# Patient Record
Sex: MALE | Race: ASIAN | NOT HISPANIC OR LATINO | URBAN - METROPOLITAN AREA
[De-identification: names, ages, dates, MRNs, and addresses within clinical notes are randomized per-mention and may not be internally consistent; named-entity substitution may affect disease eponyms.]

---

## 2019-08-06 PROBLEM — 88111009 CHANGE IN BOWEL HABIT: Status: ACTIVE | Noted: 2017-01-25

## 2022-01-04 ENCOUNTER — OFFICE VISIT (OUTPATIENT)
Dept: URBAN - METROPOLITAN AREA CLINIC 35 | Facility: CLINIC | Age: 57
End: 2022-01-04
Payer: COMMERCIAL

## 2022-01-04 VITALS
DIASTOLIC BLOOD PRESSURE: 90 MMHG | SYSTOLIC BLOOD PRESSURE: 162 MMHG | HEIGHT: 69 IN | OXYGEN SATURATION: 96 % | WEIGHT: 225 LBS | HEART RATE: 85 BPM | BODY MASS INDEX: 33.33 KG/M2

## 2022-01-04 DIAGNOSIS — K60.2 ANAL FISSURE: ICD-10-CM

## 2022-01-04 DIAGNOSIS — K62.5 HEMORRHAGE OF ANUS AND RECTUM: ICD-10-CM

## 2022-01-04 DIAGNOSIS — D50.9 IRON DEFICIENCY ANEMIA, UNSPECIFIED: ICD-10-CM

## 2022-01-04 DIAGNOSIS — K21.9 GASTRO-ESOPHAGEAL REFLUX DISEASE WITHOUT ESOPHAGITIS: ICD-10-CM

## 2022-01-04 PROBLEM — 30037006: Status: ACTIVE | Noted: 2019-08-06

## 2022-01-04 PROCEDURE — 99213 OFFICE O/P EST LOW 20 MIN: CPT | Performed by: INTERNAL MEDICINE

## 2022-01-04 RX ORDER — VITAMIN K2 90 MCG
1 CAPSULE CAPSULE ORAL ONCE A DAY
Status: ON HOLD | COMMUNITY

## 2022-01-04 RX ORDER — OMEPRAZOLE 40 MG/1
CAPSULE, DELAYED RELEASE PELLETS ORAL
OUTPATIENT

## 2022-01-04 RX ORDER — LOSARTAN POTASSIUM 100 MG/1
1 TABLET TABLET ORAL ONCE A DAY
Status: ACTIVE | COMMUNITY

## 2022-01-04 RX ORDER — SODIUM, POTASSIUM,MAG SULFATES 17.5-3.13G
17.5 GM/177 ML SOLUTION, RECONSTITUTED, ORAL ORAL
Qty: 1 KIT | Refills: 0 | OUTPATIENT
Start: 2022-01-04 | End: 2022-01-06

## 2022-01-04 RX ORDER — OMEPRAZOLE 40 MG/1
CAPSULE, DELAYED RELEASE PELLETS ORAL
Status: ACTIVE | COMMUNITY

## 2022-01-04 RX ORDER — RANITIDINE HYDROCHLORIDE 150 MG/1
1 CAPSULE CAPSULE ORAL TWICE A DAY
Status: ON HOLD | COMMUNITY

## 2022-01-04 RX ORDER — LEVOTHYROXINE SODIUM 150 UG/1
1 TABLET ON AN EMPTY STOMACH IN THE MORNING TABLET ORAL ONCE A DAY
Status: ACTIVE | COMMUNITY

## 2022-01-04 RX ORDER — OCTISALATE, AVOBENZONE, HOMOSALATE, AND OCTOCRYLENE 29.4; 29.4; 49; 25.48 MG/ML; MG/ML; MG/ML; MG/ML
AS DIRECTED LOTION TOPICAL
Status: ON HOLD | COMMUNITY
Start: 2017-01-25

## 2022-01-04 RX ORDER — PRENATAL 168/IRON/FOLIC/OMEGA3 27-800-235
CAPSULE ORAL
Status: ACTIVE | COMMUNITY

## 2022-01-04 RX ORDER — FLUOXETINE HYDROCHLORIDE 20 MG/1
1 CAPSULE IN THE MORNING CAPSULE ORAL ONCE A DAY
Status: ACTIVE | COMMUNITY

## 2022-01-04 NOTE — PHYSICAL EXAM HENT:
Head,  normocephalic,  atraumatic,  Face,  Face within normal limits,  Ears,  External ears within normal limits,  Nose/Nasopharynx,  External nose  normal appearance,  nares patent,  no nasal discharge,  Mouth and Throat,  Oral cavity appearance normal,  Breath odor normal,  Lips,  Appearance normal. MC- II

## 2022-01-04 NOTE — HPI-GERD
Patient admits control of symptoms with the use of Omeprazole 40 mg daily. Last visit (8/6/2019)  Patient presents for follow up of GERD. Patient admits he is currently taking Omeprazole 40mg daily as prescribed without true relief . He admits taking Rx twice a day most day for uncontrolled symptoms. Patient admits intially trying OTC Zantac for relief of breakthrough episodes with no true relief. Patient admits symptoms start to flare aroung 7pm before dinner. Patient admits having associated epigastric discomfort . He denies any nausa/vomiting at this time .

## 2022-01-04 NOTE — HPI-RECTAL BLEEDING
He denies  continued episodes of rectal bleeding since last visit .   Last visit (8/6/2019)  Patient presents today as a consultation for rectal bleeding. Patient states onset was last Wednesday and admits he only had one episode . Patient describes seeing bright red blood upon toilet paper after wiping . Patient admits feeling burning sensation for the remainder of the evening. He admits taking an OTC Ibuprofen to help relieve discomfort. He admits the next day all symptoms subsided . Patient denies straining during bowel movements. He admits having a bowel movement daily with normal formed stools.          Patients last colonoscopy was completed 06/2017 with normal report .  Most recent labs completed with PCP [place] June 2019.

## 2022-01-04 NOTE — HPI-IRON DEFICIENCY
Patient presents today for a consultation of JAZMIN found via labs with PCP due to generalized weakness on (12/8/2021) with Iron TIBC and Ferritin:  Ferritin (Low) 7, Iron [place] (High) 528, % Sat (Low) 12, Iron total  (WNL) 62.    CBC: Hgb (Low) 13.0, Hct (WNL)  40.9, WBC (WNL) 6.3, RBCC (WNL) 5.05, MCH (Low) 25.7, MCHC (Low) 31.8, Platelet (WNL)  313.   He began treatment with 65 mg Vitron C iron supplement 1 QD x 7 days, then d/c'd on (1/2/2022) in case he was to schedule an endoscopy.  Repeat labs with Dr. Ron Tom on (12/28/2021)  with Iron TIBC and Ferritin:  Ferritin (Low) 7, Iron [place] Cap (High) 482, % Sat (Low) 12, Iron total  (WNL) 60.    CBC: Hgb (Low) 11.6, Hct (WNL)  38.8, WBC (WNL) 6.3, RBCC (WNL) 4.55, MCH (Low) 25.5, MCHC (Low) 29.9, RDW (High) 15.4, Platelet (WNL) 255. Vit B 12 (WNL) 515  He denies a prior history of JAZMIN.  He denies having a transfusion or iron infusion.  Patient denies NSAID use, a history of GI bleeding, any blood or melena in stools, epigastric/abdominal pains, cold extremities.  He admits experiencing weakness, light headedness, and dizziness.    Patient had an EGD in (2011) and Colonoscopy in (6/ 2017)

## 2022-01-07 ENCOUNTER — TELEPHONE ENCOUNTER (OUTPATIENT)
Dept: URBAN - METROPOLITAN AREA CLINIC 36 | Facility: CLINIC | Age: 57
End: 2022-01-07

## 2022-01-12 ENCOUNTER — OFFICE VISIT (OUTPATIENT)
Dept: URBAN - METROPOLITAN AREA SURGERY CENTER 8 | Facility: SURGERY CENTER | Age: 57
End: 2022-01-12
Payer: COMMERCIAL

## 2022-01-12 DIAGNOSIS — D50.9 ANEMIA: ICD-10-CM

## 2022-01-12 DIAGNOSIS — K29.60 ADENOPAPILLOMATOSIS GASTRICA: ICD-10-CM

## 2022-01-12 DIAGNOSIS — D12.3 ADENOMA OF TRANSVERSE COLON: ICD-10-CM

## 2022-01-12 DIAGNOSIS — K31.89 ACQUIRED DEFORMITY OF DUODENUM: ICD-10-CM

## 2022-01-12 DIAGNOSIS — D12.2 ADENOMA OF ASCENDING COLON: ICD-10-CM

## 2022-01-12 DIAGNOSIS — K22.89 ESOPHAGEAL BLEEDING: ICD-10-CM

## 2022-01-12 PROCEDURE — G8907 PT DOC NO EVENTS ON DISCHARG: HCPCS | Performed by: INTERNAL MEDICINE

## 2022-01-12 PROCEDURE — 43239 EGD BIOPSY SINGLE/MULTIPLE: CPT | Performed by: INTERNAL MEDICINE

## 2022-01-12 PROCEDURE — 45385 COLONOSCOPY W/LESION REMOVAL: CPT | Performed by: INTERNAL MEDICINE

## 2022-01-12 RX ORDER — RANITIDINE HYDROCHLORIDE 150 MG/1
1 CAPSULE CAPSULE ORAL TWICE A DAY
Status: ON HOLD | COMMUNITY

## 2022-01-12 RX ORDER — LEVOTHYROXINE SODIUM 150 UG/1
1 TABLET ON AN EMPTY STOMACH IN THE MORNING TABLET ORAL ONCE A DAY
Status: ACTIVE | COMMUNITY

## 2022-01-12 RX ORDER — LOSARTAN POTASSIUM 100 MG/1
1 TABLET TABLET ORAL ONCE A DAY
Status: ACTIVE | COMMUNITY

## 2022-01-12 RX ORDER — OCTISALATE, AVOBENZONE, HOMOSALATE, AND OCTOCRYLENE 29.4; 29.4; 49; 25.48 MG/ML; MG/ML; MG/ML; MG/ML
AS DIRECTED LOTION TOPICAL
Status: ON HOLD | COMMUNITY
Start: 2017-01-25

## 2022-01-12 RX ORDER — PRENATAL 168/IRON/FOLIC/OMEGA3 27-800-235
CAPSULE ORAL
Status: ACTIVE | COMMUNITY

## 2022-01-12 RX ORDER — VITAMIN K2 90 MCG
1 CAPSULE CAPSULE ORAL ONCE A DAY
Status: ON HOLD | COMMUNITY

## 2022-01-12 RX ORDER — OMEPRAZOLE 40 MG/1
CAPSULE, DELAYED RELEASE PELLETS ORAL
Status: ACTIVE | COMMUNITY

## 2022-01-12 RX ORDER — FLUOXETINE HYDROCHLORIDE 20 MG/1
1 CAPSULE IN THE MORNING CAPSULE ORAL ONCE A DAY
Status: ACTIVE | COMMUNITY

## 2022-01-13 ENCOUNTER — TELEPHONE ENCOUNTER (OUTPATIENT)
Dept: URBAN - METROPOLITAN AREA CLINIC 36 | Facility: CLINIC | Age: 57
End: 2022-01-13

## 2022-01-13 RX ORDER — FLUOXETINE HYDROCHLORIDE 20 MG/1
1 CAPSULE IN THE MORNING CAPSULE ORAL ONCE A DAY
Status: ACTIVE | COMMUNITY

## 2022-01-13 RX ORDER — RANITIDINE HYDROCHLORIDE 150 MG/1
1 CAPSULE CAPSULE ORAL TWICE A DAY
Status: ON HOLD | COMMUNITY

## 2022-01-13 RX ORDER — OCTISALATE, AVOBENZONE, HOMOSALATE, AND OCTOCRYLENE 29.4; 29.4; 49; 25.48 MG/ML; MG/ML; MG/ML; MG/ML
AS DIRECTED LOTION TOPICAL
Status: ON HOLD | COMMUNITY
Start: 2017-01-25

## 2022-01-13 RX ORDER — LEVOTHYROXINE SODIUM 150 UG/1
1 TABLET ON AN EMPTY STOMACH IN THE MORNING TABLET ORAL ONCE A DAY
Status: ACTIVE | COMMUNITY

## 2022-01-13 RX ORDER — FERROUS FUMARATE AND POLYSACCHRIDE IRON VITAMIN MINERAL COMPLEX SUPPLEMENT 191.2; 135.9; 1; 210; 20; 5; 5; 7; 25; 3 MG/1; MG/1; MG/1; MG/1; MG/1; MG/1; MG/1; MG/1; MG/1; UG/1
1 CAPSULE CAPSULE ORAL
Qty: 180 | Refills: 1 | OUTPATIENT
Start: 2022-01-13

## 2022-01-13 RX ORDER — OMEPRAZOLE 40 MG/1
CAPSULE, DELAYED RELEASE PELLETS ORAL
Status: ACTIVE | COMMUNITY

## 2022-01-13 RX ORDER — LOSARTAN POTASSIUM 100 MG/1
1 TABLET TABLET ORAL ONCE A DAY
Status: ACTIVE | COMMUNITY

## 2022-01-13 RX ORDER — VITAMIN K2 90 MCG
1 CAPSULE CAPSULE ORAL ONCE A DAY
Status: ON HOLD | COMMUNITY

## 2022-01-13 RX ORDER — PRENATAL 168/IRON/FOLIC/OMEGA3 27-800-235
CAPSULE ORAL
Status: ACTIVE | COMMUNITY

## 2022-01-20 ENCOUNTER — TELEPHONE ENCOUNTER (OUTPATIENT)
Dept: URBAN - METROPOLITAN AREA CLINIC 36 | Facility: CLINIC | Age: 57
End: 2022-01-20

## 2022-01-25 ENCOUNTER — TELEPHONE ENCOUNTER (OUTPATIENT)
Dept: URBAN - METROPOLITAN AREA CLINIC 36 | Facility: CLINIC | Age: 57
End: 2022-01-25

## 2022-02-02 ENCOUNTER — TELEPHONE ENCOUNTER (OUTPATIENT)
Dept: URBAN - METROPOLITAN AREA CLINIC 36 | Facility: CLINIC | Age: 57
End: 2022-02-02

## 2022-02-03 ENCOUNTER — OFFICE VISIT (OUTPATIENT)
Dept: URBAN - METROPOLITAN AREA CLINIC 32 | Facility: CLINIC | Age: 57
End: 2022-02-03
Payer: COMMERCIAL

## 2022-02-03 ENCOUNTER — TELEPHONE ENCOUNTER (OUTPATIENT)
Dept: URBAN - METROPOLITAN AREA SURGERY CENTER 8 | Facility: SURGERY CENTER | Age: 57
End: 2022-02-03

## 2022-02-03 DIAGNOSIS — D50.9 ANEMIA: ICD-10-CM

## 2022-02-03 PROCEDURE — 91110 GI TRC IMG INTRAL ESOPH-ILE: CPT | Performed by: INTERNAL MEDICINE

## 2022-02-10 ENCOUNTER — OFFICE VISIT (OUTPATIENT)
Dept: URBAN - METROPOLITAN AREA CLINIC 33 | Facility: CLINIC | Age: 57
End: 2022-02-10

## 2022-02-14 ENCOUNTER — TELEPHONE ENCOUNTER (OUTPATIENT)
Dept: URBAN - METROPOLITAN AREA CLINIC 36 | Facility: CLINIC | Age: 57
End: 2022-02-14

## 2022-02-15 ENCOUNTER — TELEPHONE ENCOUNTER (OUTPATIENT)
Dept: URBAN - METROPOLITAN AREA CLINIC 36 | Facility: CLINIC | Age: 57
End: 2022-02-15

## 2022-02-15 ENCOUNTER — OFFICE VISIT (OUTPATIENT)
Dept: URBAN - METROPOLITAN AREA CLINIC 31 | Facility: CLINIC | Age: 57
End: 2022-02-15
Payer: COMMERCIAL

## 2022-02-15 ENCOUNTER — OFFICE VISIT (OUTPATIENT)
Dept: URBAN - METROPOLITAN AREA CLINIC 35 | Facility: CLINIC | Age: 57
End: 2022-02-15

## 2022-02-15 VITALS — BODY MASS INDEX: 33.33 KG/M2 | HEIGHT: 69 IN | WEIGHT: 225 LBS

## 2022-02-15 DIAGNOSIS — D12.2 BENIGN NEOPLASM OF ASCENDING COLON: ICD-10-CM

## 2022-02-15 DIAGNOSIS — D12.3 BENIGN NEOPLASM OF TRANSVERSE COLON: ICD-10-CM

## 2022-02-15 DIAGNOSIS — K21.9 GASTRO-ESOPHAGEAL REFLUX DISEASE WITHOUT ESOPHAGITIS: ICD-10-CM

## 2022-02-15 DIAGNOSIS — D50.9 IRON DEFICIENCY ANEMIA, UNSPECIFIED: ICD-10-CM

## 2022-02-15 DIAGNOSIS — K64.0 FIRST DEGREE HEMORRHOIDS: ICD-10-CM

## 2022-02-15 DIAGNOSIS — K29.30 CHRONIC SUPERFICIAL GASTRITIS, PRESENCE OF BLEEDING UNSPECIFIED: ICD-10-CM

## 2022-02-15 PROBLEM — 196735001: Status: ACTIVE | Noted: 2022-02-15

## 2022-02-15 PROCEDURE — 99214 OFFICE O/P EST MOD 30 MIN: CPT | Performed by: INTERNAL MEDICINE

## 2022-02-15 RX ORDER — FAMOTIDINE 20 MG/1
1 TABLET PM  ( BEFORE DINNER ) TABLET, FILM COATED ORAL ONCE A DAY
Qty: 90 TABLET | Refills: 1 | OUTPATIENT
Start: 2022-02-15

## 2022-02-15 RX ORDER — OCTISALATE, AVOBENZONE, HOMOSALATE, AND OCTOCRYLENE 29.4; 29.4; 49; 25.48 MG/ML; MG/ML; MG/ML; MG/ML
AS DIRECTED LOTION TOPICAL
Status: ON HOLD | COMMUNITY
Start: 2017-01-25

## 2022-02-15 RX ORDER — LOSARTAN POTASSIUM 100 MG/1
1 TABLET TABLET ORAL ONCE A DAY
Status: ACTIVE | COMMUNITY

## 2022-02-15 RX ORDER — FLUOXETINE HYDROCHLORIDE 20 MG/1
1 CAPSULE IN THE MORNING CAPSULE ORAL ONCE A DAY
Status: ACTIVE | COMMUNITY

## 2022-02-15 RX ORDER — FERROUS FUMARATE AND POLYSACCHRIDE IRON VITAMIN MINERAL COMPLEX SUPPLEMENT 191.2; 135.9; 1; 210; 20; 5; 5; 7; 25; 3 MG/1; MG/1; MG/1; MG/1; MG/1; MG/1; MG/1; MG/1; MG/1; UG/1
1 CAPSULE CAPSULE ORAL
Qty: 180 | Refills: 1 | Status: ACTIVE | COMMUNITY
Start: 2022-01-13

## 2022-02-15 RX ORDER — RANITIDINE HYDROCHLORIDE 150 MG/1
1 CAPSULE CAPSULE ORAL TWICE A DAY
Status: DISCONTINUED | COMMUNITY

## 2022-02-15 RX ORDER — OMEPRAZOLE 40 MG/1
CAPSULE, DELAYED RELEASE PELLETS ORAL
Status: ACTIVE | COMMUNITY

## 2022-02-15 RX ORDER — PRENATAL 168/IRON/FOLIC/OMEGA3 27-800-235
CAPSULE ORAL
Status: ACTIVE | COMMUNITY

## 2022-02-15 RX ORDER — OMEPRAZOLE 40 MG/1
CAPSULE, DELAYED RELEASE PELLETS ORAL
OUTPATIENT

## 2022-02-15 RX ORDER — FERROUS FUMARATE AND POLYSACCHRIDE IRON VITAMIN MINERAL COMPLEX SUPPLEMENT 191.2; 135.9; 1; 210; 20; 5; 5; 7; 25; 3 MG/1; MG/1; MG/1; MG/1; MG/1; MG/1; MG/1; MG/1; MG/1; UG/1
1 CAPSULE CAPSULE ORAL
OUTPATIENT
Start: 2022-01-13

## 2022-02-15 RX ORDER — VITAMIN K2 90 MCG
1 CAPSULE CAPSULE ORAL ONCE A DAY
Status: ON HOLD | COMMUNITY

## 2022-02-15 RX ORDER — LEVOTHYROXINE SODIUM 150 UG/1
1 TABLET ON AN EMPTY STOMACH IN THE MORNING TABLET ORAL ONCE A DAY
Status: ACTIVE | COMMUNITY

## 2022-02-15 RX ORDER — RANITIDINE HYDROCHLORIDE 150 MG/1
1 CAPSULE CAPSULE ORAL TWICE A DAY
Status: ON HOLD | COMMUNITY

## 2022-02-15 NOTE — HPI-GERD
He continue to admit control of symptoms with the use of Omeprazole 40 mg daily. He admits that he will take 20 mg in the evening to help control symptoms. He reports taking 20 mg  in the evening 1-2 times a week.    Last visit (1/4/2022) Patient admits control of symptoms with the use of Omeprazole 40 mg daily.   Last visit (8/6/2019) Patient presents for follow up of GERD. Patient admits he is currently taking Omeprazole 40mg daily as prescribed without true relief . He admits taking Rx twice a day most day for uncontrolled symptoms. Patient admits intially trying OTC Zantac for relief of breakthrough episodes with no true relief. Patient admits symptoms start to flare aroung 7pm before dinner. Patient admits having associated epigastric discomfort . He denies any nausa/vomiting at this time no

## 2022-02-15 NOTE — HPI-GERD
Patient admits control of symptoms with the use of Omeprazole 40 mg daily.   Last visit (1/4/20220  Patient admits control of symptoms with the use of Omeprazole 40 mg daily. Last visit (8/6/2019)  Patient presents for follow up of GERD. Patient admits he is currently taking Omeprazole 40mg daily as prescribed without true relief . He admits taking Rx twice a day most day for uncontrolled symptoms. Patient admits intially trying OTC Zantac for relief of breakthrough episodes with no true relief. Patient admits symptoms start to flare aroung 7pm before dinner. Patient admits having associated epigastric discomfort . He denies any nausa/vomiting at this time .

## 2022-02-15 NOTE — HPI-IRON DEFICIENCY
Patient presents today after hos colonoscopy, EGD, Capsule Endoscopy and follow up of JAZMIN. He denies any complications following his procedures.  He reprorts that prior to him taking his Viamtin supplements he experienced coldness in his extremities but symptoms have resolved. Capsule endoscopy preliminary review revealed small red spots in the small bowel , subsequent review of the capsule endoscopy revealed a small AVM     Last visit (1/4/2022) Patient presents today for a consultation of JAZMIN found via labs with PCP due to generalized weakness on (12/8/2021) with Iron TIBC and Ferritin: Ferritin (Low) 7, Iron [place] Cap (High) 528, % Sat (Low) 12, Iron total (WNL) 62. CBC: Hgb (Low) 13.0, Hct (WNL) 40.9, WBC (WNL) 6.3, RBCC (WNL) 5.05, MCH (Low) 25.7, MCHC (Low) 31.8, Platelet (WNL) 313. He began treatment with 65 mg Vitron C iron supplement 1 QD x 7 days, then d/c'd on (1/2/2022) in case he was to schedule an endoscopy.  Repeat labs with Dr. Ron Tom on (12/28/2021) with Iron TIBC and Ferritin: Ferritin (Low) 7, Iron [place] Cap (High) 482, % Sat (Low) 12, Iron total (WNL) 60. CBC: Hgb (Low) 11.6, Hct (WNL) 38.8, WBC (WNL) 6.3, RBCC (WNL) 4.55, MCH (Low) 25.5, MCHC (Low) 29.9, RDW (High) 15.4, Platelet (WNL) 255. Vit B 12 (WNL) 515 He denies a prior history of JAZMIN.   He denies having a transfusion or iron infusion. Patient denies NSAID use, a history of GI bleeding, any blood or melena in stools, epigastric/abdominal pains, cold extremities. He admits experiencing weakness, light headedness, and dizziness. Patient had an EGD in (2011) and Colonoscopy in (6/ 2017).

## 2022-02-15 NOTE — HPI-IRON DEFICIENCY
Patient presents today after hos colonoscopy, EGD, Capsule Endoscopy and follow up of JAZMIN.  He denies any complications following his procedures.   Last visit (1/4/2022) Patient presents today for a consultation of JAZMIN found via labs with PCP due to generalized weakness on (12/8/2021) with Iron TIBC and Ferritin:  Ferritin (Low) 7, Iron [place] Cap (High) 528, % Sat (Low) 12, Iron total  (WNL) 62.    CBC: Hgb (Low) 13.0, Hct (WNL)  40.9, WBC (WNL) 6.3, RBCC (WNL) 5.05, MCH (Low) 25.7, MCHC (Low) 31.8, Platelet (WNL)  313.   He began treatment with 65 mg Vitron C iron supplement 1 QD x 7 days, then d/c'd on (1/2/2022) in case he was to schedule an endoscopy.  Repeat labs with Dr. Ron Tom on (12/28/2021)  with Iron TIBC and Ferritin:  Ferritin (Low) 7, Iron [place] Cap (High) 482, % Sat (Low) 12, Iron total  (WNL) 60.    CBC: Hgb (Low) 11.6, Hct (WNL)  38.8, WBC (WNL) 6.3, RBCC (WNL) 4.55, MCH (Low) 25.5, MCHC (Low) 29.9, RDW (High) 15.4, Platelet (WNL) 255. Vit B 12 (WNL) 515  He denies a prior history of JAZMIN.  He denies having a transfusion or iron infusion.  Patient denies NSAID use, a history of GI bleeding, any blood or melena in stools, epigastric/abdominal pains, cold extremities.  He admits experiencing weakness, light headedness, and dizziness.    Patient had an EGD in (2011) and Colonoscopy in (6/ 2017)

## 2022-02-15 NOTE — HPI-RECTAL BLEEDING
He continue to deny e[isodes of rectal bleeding  Last visit (1/4/2022) He denies  continued episodes of rectal bleeding since last visit .   Last visit (8/6/2019)  Patient presents today as a consultation for rectal bleeding. Patient states onset was last Wednesday and admits he only had one episode . Patient describes seeing bright red blood upon toilet paper after wiping . Patient admits feeling burning sensation for the remainder of the evening. He admits taking an OTC Ibuprofen to help relieve discomfort. He admits the next day all symptoms subsided . Patient denies straining during bowel movements. He admits having a bowel movement daily with normal formed stools.          Patients last colonoscopy was completed 06/2017 with normal report .  Most recent labs completed with PCP [place] June 2019.

## 2022-02-16 ENCOUNTER — TELEPHONE ENCOUNTER (OUTPATIENT)
Dept: URBAN - METROPOLITAN AREA CLINIC 36 | Facility: CLINIC | Age: 57
End: 2022-02-16

## 2022-02-22 ENCOUNTER — OFFICE VISIT (OUTPATIENT)
Dept: URBAN - METROPOLITAN AREA CLINIC 35 | Facility: CLINIC | Age: 57
End: 2022-02-22

## 2022-02-23 ENCOUNTER — TELEPHONE ENCOUNTER (OUTPATIENT)
Dept: URBAN - METROPOLITAN AREA CLINIC 36 | Facility: CLINIC | Age: 57
End: 2022-02-23

## 2022-03-04 LAB
% SATURATION: 25
ABSOLUTE BASOPHILS: 51
ABSOLUTE EOSINOPHILS: 148
ABSOLUTE LYMPHOCYTES: 2206
ABSOLUTE MONOCYTES: 559
ABSOLUTE NEUTROPHILS: 2736
BASOPHILS: 0.9
EOSINOPHILS: 2.6
FERRITIN: 16
HEMATOCRIT: 41.8
HEMOGLOBIN: 13.2
IRON BINDING CAPACITY: 445
IRON, TOTAL: 112
LYMPHOCYTES: 38.7
MCH: 27.3
MCHC: 31.6
MCV: 86.5
MONOCYTES: 9.8
MPV: 11.1
NEUTROPHILS: 48
PLATELET COUNT: 235
RDW: 15.1
RED BLOOD CELL COUNT: 4.83
WHITE BLOOD CELL COUNT: 5.7

## 2022-03-15 ENCOUNTER — TELEPHONE ENCOUNTER (OUTPATIENT)
Dept: URBAN - METROPOLITAN AREA CLINIC 33 | Facility: CLINIC | Age: 57
End: 2022-03-15

## 2022-06-01 ENCOUNTER — TELEPHONE ENCOUNTER (OUTPATIENT)
Dept: URBAN - METROPOLITAN AREA CLINIC 35 | Facility: CLINIC | Age: 57
End: 2022-06-01

## 2022-06-07 LAB
% SATURATION: 22
FERRITIN: 47
IRON BINDING CAPACITY: 385
IRON, TOTAL: 86

## 2022-06-14 ENCOUNTER — LAB OUTSIDE AN ENCOUNTER (OUTPATIENT)
Dept: URBAN - METROPOLITAN AREA CLINIC 33 | Facility: CLINIC | Age: 57
End: 2022-06-14

## 2022-07-26 ENCOUNTER — OFFICE VISIT (OUTPATIENT)
Dept: URBAN - METROPOLITAN AREA CLINIC 35 | Facility: CLINIC | Age: 57
End: 2022-07-26
Payer: COMMERCIAL

## 2022-07-26 ENCOUNTER — TELEPHONE ENCOUNTER (OUTPATIENT)
Dept: URBAN - METROPOLITAN AREA CLINIC 35 | Facility: CLINIC | Age: 57
End: 2022-07-26

## 2022-07-26 VITALS — HEIGHT: 69 IN | BODY MASS INDEX: 32.88 KG/M2 | WEIGHT: 222 LBS

## 2022-07-26 DIAGNOSIS — D50.9 IRON DEFICIENCY ANEMIA, UNSPECIFIED: ICD-10-CM

## 2022-07-26 DIAGNOSIS — K63.89 EPIPLOIC APPENDAGITIS: ICD-10-CM

## 2022-07-26 PROBLEM — 14720231000119109: Status: ACTIVE | Noted: 2022-07-26

## 2022-07-26 PROCEDURE — 99213 OFFICE O/P EST LOW 20 MIN: CPT | Performed by: INTERNAL MEDICINE

## 2022-07-26 RX ORDER — SEMAGLUTIDE 1.34 MG/ML
AS DIRECTED INJECTION, SOLUTION SUBCUTANEOUS
Status: ACTIVE | COMMUNITY

## 2022-07-26 RX ORDER — FLUOXETINE HYDROCHLORIDE 20 MG/1
1 CAPSULE IN THE MORNING CAPSULE ORAL ONCE A DAY
Status: ON HOLD | COMMUNITY

## 2022-07-26 RX ORDER — FERROUS FUMARATE AND POLYSACCHRIDE IRON VITAMIN MINERAL COMPLEX SUPPLEMENT 191.2; 135.9; 1; 210; 20; 5; 5; 7; 25; 3 MG/1; MG/1; MG/1; MG/1; MG/1; MG/1; MG/1; MG/1; MG/1; UG/1
1 CAPSULE CAPSULE ORAL
Qty: 180 | Refills: 1 | Status: ON HOLD | COMMUNITY
Start: 2022-01-13

## 2022-07-26 RX ORDER — TRAMADOL HYDROCHLORIDE 50 MG/1
1 TABLET AS NEEDED TABLET, FILM COATED ORAL
Qty: 12 | Refills: 0 | OUTPATIENT
Start: 2022-07-26 | End: 2022-07-30

## 2022-07-26 RX ORDER — OCTISALATE, AVOBENZONE, HOMOSALATE, AND OCTOCRYLENE 29.4; 29.4; 49; 25.48 MG/ML; MG/ML; MG/ML; MG/ML
AS DIRECTED LOTION TOPICAL
Status: ON HOLD | COMMUNITY
Start: 2017-01-25

## 2022-07-26 RX ORDER — LOSARTAN POTASSIUM 100 MG/1
1 TABLET TABLET ORAL ONCE A DAY
Status: ACTIVE | COMMUNITY

## 2022-07-26 RX ORDER — LEVOTHYROXINE SODIUM 150 UG/1
1 TABLET ON AN EMPTY STOMACH IN THE MORNING TABLET ORAL ONCE A DAY
Status: ACTIVE | COMMUNITY

## 2022-07-26 RX ORDER — PRENATAL 168/IRON/FOLIC/OMEGA3 27-800-235
CAPSULE ORAL
Status: ACTIVE | COMMUNITY

## 2022-07-26 RX ORDER — OMEPRAZOLE 40 MG/1
CAPSULE, DELAYED RELEASE PELLETS ORAL
Status: ACTIVE | COMMUNITY

## 2022-07-26 RX ORDER — VITAMIN K2 90 MCG
1 CAPSULE CAPSULE ORAL ONCE A DAY
Status: ON HOLD | COMMUNITY

## 2022-07-26 RX ORDER — RANITIDINE HYDROCHLORIDE 150 MG/1
1 CAPSULE CAPSULE ORAL TWICE A DAY
Status: ON HOLD | COMMUNITY

## 2022-07-26 RX ORDER — FAMOTIDINE 20 MG/1
1 TABLET PM  ( BEFORE DINNER ) TABLET, FILM COATED ORAL ONCE A DAY
Qty: 90 TABLET | Refills: 1 | Status: ACTIVE | COMMUNITY
Start: 2022-02-15

## 2022-07-26 NOTE — HPI-ABDOMINAL PAIN
He reports a constant RLQ pain that is increased if he moves or bends that has occurred for 5 days now. He thought symptoms were muscular but he reports that he no longer beleives this because symptoms occur more often.   He was seen at his PCP he had a CT completed STAT on 7/25/2022 revealing moderate epiploic appendagitis involiving right colon. No perforation or abscess evident.   There was labs completd on 7/25/2022 also with normal results.   Reports with the use of Iron his stools are dark in color - He admits 2 bowel movements a day with no strain. His stools are formed he denies any blood or mucus. He denies any rectal pain or pruritus ani.

## 2022-07-26 NOTE — HPI-IRON DEFICIENCY
Patient presents today for a follow up of JAZMIN and review labs.  He reports that he seen his PCP has decreased his iron intake to 125 mg 1 PO QD. His iron intake was decreased to improvement in iron levels.    Last visit (2/15/2022) Patient presents today after his colonoscopy, EGD, Capsule Endoscopy and follow up of JAZMIN.  He denies any complications following his procedures.

## 2022-07-26 NOTE — HPI-GERD
He has been taking  Famotidine Tablet, 20 MG, 1 tablet PM ( before dinner ), and Omeprazole capsule, 40 mg, 30 minutes before breakfast and states that these medications control all symptoms that he may have of acid reflux.  [facility] with [facility] use of Omeprazole 40 mg daily.

## 2022-07-26 NOTE — HPI-RECTAL BLEEDING
Clinical Notes: Colonoscopy in 5 years.  Last visit (2/15/2022) He continue to deny e[isodes of rectal bleeding  Last visit (1/4/2022) He denies  continued episodes of rectal bleeding since last visit .   Last visit (8/6/2019)  Patient presents today as a consultation for rectal bleeding. Patient states onset was last Wednesday and admits he only had one episode . Patient describes seeing bright red blood upon toilet paper after wiping . Patient admits feeling burning sensation for the remainder of the evening. He admits taking an OTC Ibuprofen to help relieve discomfort. He admits the next day all symptoms subsided . Patient denies straining during bowel movements. He admits having a bowel movement daily with normal formed stools.          Patients last colonoscopy was completed 06/2017 with normal report .  Most recent labs completed with PCP [place] June 2019.

## 2022-07-27 ENCOUNTER — TELEPHONE ENCOUNTER (OUTPATIENT)
Dept: URBAN - METROPOLITAN AREA CLINIC 35 | Facility: CLINIC | Age: 57
End: 2022-07-27

## 2022-08-19 ENCOUNTER — ERX REFILL RESPONSE (OUTPATIENT)
Dept: URBAN - METROPOLITAN AREA CLINIC 31 | Facility: CLINIC | Age: 57
End: 2022-08-19

## 2022-08-19 RX ORDER — FAMOTIDINE 20 MG/1
1 TABLET PM  ( BEFORE DINNER ) TABLET, FILM COATED ORAL ONCE A DAY
Qty: 90 TABLET | Refills: 1 | OUTPATIENT

## 2022-08-19 RX ORDER — FAMOTIDINE 20 MG/1
TAKE 1 TABLET BY MOUTH NIGHTLY BEFORE DINNER FOR 90 DAYS TABLET, FILM COATED ORAL
Qty: 90 TABLET | Refills: 1 | OUTPATIENT

## 2022-08-23 ENCOUNTER — OFFICE VISIT (OUTPATIENT)
Dept: URBAN - METROPOLITAN AREA CLINIC 35 | Facility: CLINIC | Age: 57
End: 2022-08-23
Payer: COMMERCIAL

## 2022-08-23 VITALS
BODY MASS INDEX: 31.84 KG/M2 | SYSTOLIC BLOOD PRESSURE: 124 MMHG | HEART RATE: 80 BPM | HEIGHT: 69 IN | DIASTOLIC BLOOD PRESSURE: 84 MMHG | OXYGEN SATURATION: 98 % | WEIGHT: 215 LBS

## 2022-08-23 DIAGNOSIS — K21.9 GASTRO-ESOPHAGEAL REFLUX DISEASE WITHOUT ESOPHAGITIS: ICD-10-CM

## 2022-08-23 DIAGNOSIS — D50.9 IRON DEFICIENCY ANEMIA, UNSPECIFIED: ICD-10-CM

## 2022-08-23 DIAGNOSIS — K63.89 EPIPLOIC APPENDAGITIS: ICD-10-CM

## 2022-08-23 PROCEDURE — 99214 OFFICE O/P EST MOD 30 MIN: CPT | Performed by: INTERNAL MEDICINE

## 2022-08-23 RX ORDER — SEMAGLUTIDE 1.34 MG/ML
AS DIRECTED INJECTION, SOLUTION SUBCUTANEOUS
Status: ACTIVE | COMMUNITY

## 2022-08-23 RX ORDER — VITAMIN K2 90 MCG
1 CAPSULE CAPSULE ORAL ONCE A DAY
Status: ON HOLD | COMMUNITY

## 2022-08-23 RX ORDER — FLUOXETINE HYDROCHLORIDE 20 MG/1
1 CAPSULE IN THE MORNING CAPSULE ORAL ONCE A DAY
Status: ON HOLD | COMMUNITY

## 2022-08-23 RX ORDER — OCTISALATE, AVOBENZONE, HOMOSALATE, AND OCTOCRYLENE 29.4; 29.4; 49; 25.48 MG/ML; MG/ML; MG/ML; MG/ML
AS DIRECTED LOTION TOPICAL
Status: ON HOLD | COMMUNITY
Start: 2017-01-25

## 2022-08-23 RX ORDER — LEVOTHYROXINE SODIUM 150 UG/1
1 TABLET ON AN EMPTY STOMACH IN THE MORNING TABLET ORAL ONCE A DAY
Status: ACTIVE | COMMUNITY

## 2022-08-23 RX ORDER — PRENATAL 168/IRON/FOLIC/OMEGA3 27-800-235
CAPSULE ORAL
Status: ACTIVE | COMMUNITY

## 2022-08-23 RX ORDER — FERROUS FUMARATE AND POLYSACCHRIDE IRON VITAMIN MINERAL COMPLEX SUPPLEMENT 191.2; 135.9; 1; 210; 20; 5; 5; 7; 25; 3 MG/1; MG/1; MG/1; MG/1; MG/1; MG/1; MG/1; MG/1; MG/1; UG/1
1 CAPSULE CAPSULE ORAL
Qty: 180 | Refills: 1 | Status: ON HOLD | COMMUNITY
Start: 2022-01-13

## 2022-08-23 RX ORDER — OMEPRAZOLE 40 MG/1
1 CAPSULE 30 MINUTES BEFORE MORNING MEAL CAPSULE, DELAYED RELEASE ORAL ONCE A DAY
Qty: 90 | Refills: 3 | OUTPATIENT

## 2022-08-23 RX ORDER — FAMOTIDINE 20 MG/1
1 TABLET AT BEDTIME AS NEEDED TABLET, FILM COATED ORAL ONCE A DAY
Qty: 90 TABLET | Refills: 3 | OUTPATIENT

## 2022-08-23 RX ORDER — LOSARTAN POTASSIUM 100 MG/1
1 TABLET TABLET ORAL ONCE A DAY
Status: ACTIVE | COMMUNITY

## 2022-08-23 RX ORDER — FAMOTIDINE 20 MG/1
TAKE 1 TABLET BY MOUTH NIGHTLY BEFORE DINNER FOR 90 DAYS TABLET, FILM COATED ORAL
Qty: 90 TABLET | Refills: 1 | Status: ACTIVE | COMMUNITY

## 2022-08-23 RX ORDER — OMEPRAZOLE 40 MG/1
CAPSULE, DELAYED RELEASE PELLETS ORAL
Status: ACTIVE | COMMUNITY

## 2022-08-23 RX ORDER — RANITIDINE HYDROCHLORIDE 150 MG/1
1 CAPSULE CAPSULE ORAL TWICE A DAY
Status: ON HOLD | COMMUNITY

## 2022-08-23 NOTE — HPI-ABDOMINAL PAIN
Admits episodes of RLQ abdominal pain.  He took Tramadol 50 MG, 1 QID prn with relief. He states that he no longer takes Iron supplements at this time due to increased abdominal pain.    Last visit (7/26/2022) He reports a constant RLQ pain that is increased if he moves or bends that has occurred for 5 days now. He thought symptoms were muscular but he reports that he no longer beleives this because symptoms occur more often.   He was seen at his PCP he had a CT completed STAT on 7/25/2022 revealing moderate epiploic appendagitis involiving right colon. No perforation or abscess evident.   There was labs completd on 7/25/2022 also with normal results.   Reports with the use of Iron his stools are dark in color - He admits 2 bowel movements a day with no strain. His stools are formed he denies any blood or mucus. He denies any rectal pain or pruritus ani.

## 2022-08-23 NOTE — HPI-IRON DEFICIENCY
Patient presents today for a follow up of JAZMIN.  He discontinued taking Iron last week. He reports that it was causing abdominal discomfort and loose stools.      He completed labs with PCP (7/25/2022) CBC (WNL).  He admit/denies any blood or melena in stools, epigastric/abdominal pains, fatigue, cold extremities, light headedness, dizziness.

## 2022-08-23 NOTE — HPI-GERD
He continue to admit control of symptoms with the use of  Omeprazole 40 mg, 1 QD and Famotidine 20 MG, 1 tablet PM ( before dinner).   Last visit (7/26/2022) He has been taking  Famotidine Tablet, 20 MG, 1 tablet PM ( before dinner ), and Omeprazole capsule, 40 mg, 30 minutes before breakfast and states that these medications control all symptoms that he may have of acid reflux.

## 2022-08-25 ENCOUNTER — LAB OUTSIDE AN ENCOUNTER (OUTPATIENT)
Dept: URBAN - METROPOLITAN AREA CLINIC 35 | Facility: CLINIC | Age: 57
End: 2022-08-25

## 2022-08-26 LAB
% SATURATION: 20
ABSOLUTE BASOPHILS: 32
ABSOLUTE EOSINOPHILS: 139
ABSOLUTE LYMPHOCYTES: 2627
ABSOLUTE MONOCYTES: 630
ABSOLUTE NEUTROPHILS: 2873
BASOPHILS: 0.5
EOSINOPHILS: 2.2
FERRITIN: 53
HEMATOCRIT: 43.8
HEMOGLOBIN: 14.1
IRON BINDING CAPACITY: 413
IRON, TOTAL: 82
LYMPHOCYTES: 41.7
MCH: 28.4
MCHC: 32.2
MCV: 88.3
MONOCYTES: 10
MPV: 12
NEUTROPHILS: 45.6
PLATELET COUNT: 227
RDW: 12.3
RED BLOOD CELL COUNT: 4.96
WHITE BLOOD CELL COUNT: 6.3

## 2022-08-29 ENCOUNTER — TELEPHONE ENCOUNTER (OUTPATIENT)
Dept: URBAN - METROPOLITAN AREA CLINIC 36 | Facility: CLINIC | Age: 57
End: 2022-08-29

## 2022-11-10 ENCOUNTER — TELEPHONE ENCOUNTER (OUTPATIENT)
Dept: URBAN - METROPOLITAN AREA CLINIC 35 | Facility: CLINIC | Age: 57
End: 2022-11-10

## 2022-11-10 RX ORDER — FAMOTIDINE 20 MG/1
1 TABLET AT BEDTIME AS NEEDED TABLET, FILM COATED ORAL ONCE A DAY
Qty: 90 TABLET | Refills: 3

## 2023-02-15 PROBLEM — 266464001 HEMORRHAGE OF RECTUM AND ANUS: Status: ACTIVE | Noted: 2019-08-06

## 2023-02-15 PROBLEM — 266435005 GASTRO-ESOPHAGEAL REFLUX DISEASE WITHOUT ESOPHAGITIS: Status: ACTIVE | Noted: 2017-01-25

## 2023-02-21 ENCOUNTER — OFFICE VISIT (OUTPATIENT)
Dept: URBAN - METROPOLITAN AREA CLINIC 35 | Facility: CLINIC | Age: 58
End: 2023-02-21
Payer: COMMERCIAL

## 2023-02-21 VITALS
OXYGEN SATURATION: 97 % | WEIGHT: 222 LBS | SYSTOLIC BLOOD PRESSURE: 126 MMHG | BODY MASS INDEX: 32.88 KG/M2 | HEIGHT: 69 IN | HEART RATE: 81 BPM | DIASTOLIC BLOOD PRESSURE: 84 MMHG

## 2023-02-21 DIAGNOSIS — K21.9 GASTRO-ESOPHAGEAL REFLUX DISEASE WITHOUT ESOPHAGITIS: ICD-10-CM

## 2023-02-21 DIAGNOSIS — D50.9 IRON DEFICIENCY ANEMIA, UNSPECIFIED: ICD-10-CM

## 2023-02-21 DIAGNOSIS — K62.5 HEMORRHAGE OF ANUS AND RECTUM: ICD-10-CM

## 2023-02-21 DIAGNOSIS — R10.11 ABDOMINAL PAIN: ICD-10-CM

## 2023-02-21 PROCEDURE — 99214 OFFICE O/P EST MOD 30 MIN: CPT | Performed by: INTERNAL MEDICINE

## 2023-02-21 RX ORDER — FERROUS FUMARATE AND POLYSACCHRIDE IRON VITAMIN MINERAL COMPLEX SUPPLEMENT 191.2; 135.9; 1; 210; 20; 5; 5; 7; 25; 3 MG/1; MG/1; MG/1; MG/1; MG/1; MG/1; MG/1; MG/1; MG/1; UG/1
1 CAPSULE CAPSULE ORAL
Qty: 180 | Refills: 1 | Status: ON HOLD | COMMUNITY
Start: 2022-01-13

## 2023-02-21 RX ORDER — LEVOTHYROXINE SODIUM 150 UG/1
1 TABLET ON AN EMPTY STOMACH IN THE MORNING TABLET ORAL ONCE A DAY
Status: ACTIVE | COMMUNITY

## 2023-02-21 RX ORDER — VITAMIN K2 90 MCG
1 CAPSULE CAPSULE ORAL ONCE A DAY
Status: ON HOLD | COMMUNITY

## 2023-02-21 RX ORDER — RANITIDINE HYDROCHLORIDE 150 MG/1
1 CAPSULE CAPSULE ORAL TWICE A DAY
Status: ON HOLD | COMMUNITY

## 2023-02-21 RX ORDER — FAMOTIDINE 20 MG/1
1 TABLET AT BEDTIME AS NEEDED TABLET, FILM COATED ORAL ONCE A DAY
Qty: 90 TABLET | Refills: 3

## 2023-02-21 RX ORDER — LOSARTAN POTASSIUM 100 MG/1
1 TABLET TABLET ORAL ONCE A DAY
Status: ACTIVE | COMMUNITY

## 2023-02-21 RX ORDER — FAMOTIDINE 20 MG/1
1 TABLET AT BEDTIME AS NEEDED TABLET, FILM COATED ORAL ONCE A DAY
Qty: 90 TABLET | Refills: 3 | Status: ACTIVE | COMMUNITY

## 2023-02-21 RX ORDER — OMEPRAZOLE 40 MG/1
1 CAPSULE 30 MINUTES BEFORE MORNING MEAL CAPSULE, DELAYED RELEASE ORAL ONCE A DAY
Qty: 90 | Refills: 3 | Status: ACTIVE | COMMUNITY

## 2023-02-21 RX ORDER — OMEPRAZOLE 20 MG/1
1 CAPSULE 30 MINUTES BEFORE MORNING MEAL CAPSULE, DELAYED RELEASE ORAL ONCE A DAY
Qty: 90 | Refills: 3 | OUTPATIENT
Start: 2023-02-21

## 2023-02-21 RX ORDER — OCTISALATE, AVOBENZONE, HOMOSALATE, AND OCTOCRYLENE 29.4; 29.4; 49; 25.48 MG/ML; MG/ML; MG/ML; MG/ML
AS DIRECTED LOTION TOPICAL
Status: ON HOLD | COMMUNITY
Start: 2017-01-25

## 2023-02-21 RX ORDER — SEMAGLUTIDE 1.34 MG/ML
AS DIRECTED INJECTION, SOLUTION SUBCUTANEOUS
Status: ON HOLD | COMMUNITY

## 2023-02-21 RX ORDER — FLUOXETINE HYDROCHLORIDE 20 MG/1
1 CAPSULE IN THE MORNING CAPSULE ORAL ONCE A DAY
Status: ON HOLD | COMMUNITY

## 2023-02-21 RX ORDER — PRENATAL 168/IRON/FOLIC/OMEGA3 27-800-235
CAPSULE ORAL
Status: ACTIVE | COMMUNITY

## 2023-02-21 NOTE — HPI-GERD
He continues the use of Omeprazole 40mg and Famotidine 20mg with control symptoms. Patient reports that it is very rare that he may have to take 1-2 Tums. Denies dysphagia, globus, sour eructations, bloating/gas, indigestion, early satiety, changes in appetite, coughing, abdominal/epigastric pain, or changes in bowel habits.   (Last Visit 08.23.2022) He continue to admit control of symptoms with the use of  Omeprazole 40 mg, 1 QD and Famotidine 20 MG, 1 tablet PM ( before dinner).

## 2023-02-21 NOTE — HPI-ABDOMINAL PAIN
Denies continues episodes of abdominal.  (Last Visit 08.23.2022) Admits episodes of RLQ abdominal pain.  He took Tramadol 50 MG, 1 QID prn with relief. He states that he no longer takes Iron supplements at this time due to increased abdominal pain.

## 2023-02-21 NOTE — HPI-RECTAL BLEEDING
Patient denies any recent episodes of rectal bleeding. Currently, having 1 bowel movement a day, without strain. Stools are semi formed without the presence of blood, mucus, and melena. He denies any pruritus ani or rectal pain.    (Last Visit 08.23.2022) He continue to deny episodes of rectal bleeding. Currently reports 2 bowel movements a day without strain. His stools are formed without blood or mucus present. He denies any rectal pain or pruritus ani.

## 2023-02-21 NOTE — HPI-IRON DEFICIENCY
Patient presents today for a follow up of anemia. He currently denies any iron supplements. Patient denies any blood or melena in stools, epigastric/abdominal pains, fatigue, cold extremities, lightheadedness, dizziness, shortness of breath or headaches.   He admits that since his last visit he seen his PCP and his Iron levels were normal, he no longer takes any Iron Supplements at this time. Patient reports that he has any appt with his PCP on 2/23/2023 and more labs will be drawn at that time.      (Last Visit 08.23.2022) Patient presents today for a follow up of JAZMIN.  He discontinued taking Iron last week. He reports that it was causing abdominal discomfort and loose stools.      He completed labs with PCP (7/25/2022) CBC (WNL).  He admit/denies any blood or melena in stools, epigastric/abdominal pains, fatigue, cold extremities, light headedness, dizziness.

## 2023-02-24 ENCOUNTER — WEB ENCOUNTER (OUTPATIENT)
Dept: URBAN - METROPOLITAN AREA CLINIC 35 | Facility: CLINIC | Age: 58
End: 2023-02-24

## 2023-03-01 PROBLEM — 87522002 IRON DEFICIENCY ANEMIA: Status: ACTIVE | Noted: 2022-01-04

## 2023-08-01 ENCOUNTER — LAB OUTSIDE AN ENCOUNTER (OUTPATIENT)
Dept: URBAN - METROPOLITAN AREA CLINIC 35 | Facility: CLINIC | Age: 58
End: 2023-08-01

## 2023-08-15 ENCOUNTER — OFFICE VISIT (OUTPATIENT)
Dept: URBAN - METROPOLITAN AREA CLINIC 35 | Facility: CLINIC | Age: 58
End: 2023-08-15
Payer: COMMERCIAL

## 2023-08-15 ENCOUNTER — DASHBOARD ENCOUNTERS (OUTPATIENT)
Age: 58
End: 2023-08-15

## 2023-08-15 VITALS
BODY MASS INDEX: 30.78 KG/M2 | OXYGEN SATURATION: 96 % | HEART RATE: 65 BPM | HEIGHT: 69 IN | SYSTOLIC BLOOD PRESSURE: 116 MMHG | WEIGHT: 207.8 LBS | DIASTOLIC BLOOD PRESSURE: 78 MMHG

## 2023-08-15 DIAGNOSIS — K21.9 GASTRO-ESOPHAGEAL REFLUX DISEASE WITHOUT ESOPHAGITIS: ICD-10-CM

## 2023-08-15 DIAGNOSIS — R10.11 ABDOMINAL PAIN: ICD-10-CM

## 2023-08-15 DIAGNOSIS — K62.5 HEMORRHAGE OF ANUS AND RECTUM: ICD-10-CM

## 2023-08-15 DIAGNOSIS — D50.9 IRON DEFICIENCY ANEMIA, UNSPECIFIED: ICD-10-CM

## 2023-08-15 PROCEDURE — 99214 OFFICE O/P EST MOD 30 MIN: CPT | Performed by: INTERNAL MEDICINE

## 2023-08-15 RX ORDER — LEVOTHYROXINE SODIUM 150 UG/1
1 TABLET ON AN EMPTY STOMACH IN THE MORNING TABLET ORAL ONCE A DAY
Status: ACTIVE | COMMUNITY

## 2023-08-15 RX ORDER — OMEPRAZOLE 20 MG/1
1 CAPSULE 30 MINUTES BEFORE MORNING MEAL CAPSULE, DELAYED RELEASE ORAL ONCE A DAY
Qty: 90 | Refills: 3 | Status: ACTIVE | COMMUNITY
Start: 2023-02-21

## 2023-08-15 RX ORDER — FAMOTIDINE 20 MG/1
1 TABLET AT BEDTIME AS NEEDED TABLET, FILM COATED ORAL ONCE A DAY
Qty: 90 TABLET | Refills: 3

## 2023-08-15 RX ORDER — ATORVASTATIN CALCIUM 40 MG/1
1 TABLET TABLET, FILM COATED ORAL ONCE A DAY
Status: ACTIVE | COMMUNITY

## 2023-08-15 RX ORDER — PRENATAL 168/IRON/FOLIC/OMEGA3 27-800-235
CAPSULE ORAL
Status: ACTIVE | COMMUNITY

## 2023-08-15 RX ORDER — SEMAGLUTIDE 1.34 MG/ML
AS DIRECTED INJECTION, SOLUTION SUBCUTANEOUS
Status: ON HOLD | COMMUNITY

## 2023-08-15 RX ORDER — OMEPRAZOLE 40 MG/1
1 CAPSULE 30 MINUTES BEFORE MORNING MEAL CAPSULE, DELAYED RELEASE ORAL ONCE A DAY
Qty: 90 | Refills: 3 | Status: ON HOLD | COMMUNITY

## 2023-08-15 RX ORDER — VITAMIN K2 90 MCG
1 CAPSULE CAPSULE ORAL ONCE A DAY
Status: ON HOLD | COMMUNITY

## 2023-08-15 RX ORDER — FERROUS FUMARATE AND POLYSACCHRIDE IRON VITAMIN MINERAL COMPLEX SUPPLEMENT 191.2; 135.9; 1; 210; 20; 5; 5; 7; 25; 3 MG/1; MG/1; MG/1; MG/1; MG/1; MG/1; MG/1; MG/1; MG/1; UG/1
1 CAPSULE CAPSULE ORAL
Qty: 180 | Refills: 1 | Status: ON HOLD | COMMUNITY
Start: 2022-01-13

## 2023-08-15 RX ORDER — OCTISALATE, AVOBENZONE, HOMOSALATE, AND OCTOCRYLENE 29.4; 29.4; 49; 25.48 MG/ML; MG/ML; MG/ML; MG/ML
AS DIRECTED LOTION TOPICAL
Status: ON HOLD | COMMUNITY
Start: 2017-01-25

## 2023-08-15 RX ORDER — FLUOXETINE HYDROCHLORIDE 20 MG/1
1 CAPSULE IN THE MORNING CAPSULE ORAL ONCE A DAY
Status: ON HOLD | COMMUNITY

## 2023-08-15 RX ORDER — RANITIDINE HYDROCHLORIDE 150 MG/1
1 CAPSULE CAPSULE ORAL TWICE A DAY
Status: ON HOLD | COMMUNITY

## 2023-08-15 RX ORDER — OMEPRAZOLE 20 MG/1
1 CAPSULE 30 MINUTES BEFORE MORNING MEAL CAPSULE, DELAYED RELEASE ORAL ONCE A DAY
Qty: 90 | Refills: 3 | OUTPATIENT

## 2023-08-15 RX ORDER — LOSARTAN POTASSIUM 100 MG/1
1 TABLET TABLET ORAL ONCE A DAY
Status: ACTIVE | COMMUNITY

## 2023-08-15 RX ORDER — FAMOTIDINE 20 MG/1
1 TABLET AT BEDTIME AS NEEDED TABLET, FILM COATED ORAL ONCE A DAY
Qty: 90 TABLET | Refills: 3 | Status: ACTIVE | COMMUNITY

## 2023-08-15 NOTE — HPI-IRON DEFICIENCY
Patient presents today for a 6 month follow up of iron deficiency. He denies any iron infusions or supplements. Patient denies any associated symptoms of blood or melena in stools, epigastric/abdominal pains, fatigue, cold extremities, lightheadedness, dizziness, shortness of breath or headaches.   Most recent labs were completed 02.24.2023 which were all normal.  (Last Visit 02.21.2023) Patient presents today for a follow up of anemia. He currently denies any iron supplements. Patient denies any blood or melena in stools, epigastric/abdominal pains, fatigue, cold extremities, lightheadedness, dizziness, shortness of breath or headaches.   He admits that since his last visit he seen his PCP and his Iron levels were normal, he no longer takes any Iron Supplements at this time. Patient reports that he has any appt with his PCP on 2/23/2023 and more labs will be drawn at that time.      (Last Visit 08.23.2022) Patient presents today for a follow up of JAZMIN.  He discontinued taking Iron last week. He reports that it was causing abdominal discomfort and loose stools.      He completed labs with PCP (7/25/2022) CBC (WNL).  He admit/denies any blood or melena in stools, epigastric/abdominal pains, fatigue, cold extremities, light headedness, dizziness.

## 2023-08-15 NOTE — HPI-GERD
Patient states he has not had any recent flares or issues with his GERD. Patient admits continued use of Omeprazole 20mg and Famotidine 20mg with control symptoms. He denies any associated symptoms of dysphagia, globus, sour eructation, bloating/gas, indigestion, early satiety, changes in appetite, coughing, abdominal/epigastric pain.  (Last Visit 02.21.2023) He continues the use of Omeprazole 40mg and Famotidine 20mg with control symptoms. Patient reports that it is very rare that he may have to take 1-2 Tums. Denies dysphagia, globus, sour eructations, bloating/gas, indigestion, early satiety, changes in appetite, coughing, abdominal/epigastric pain, or changes in bowel habits.   (Last Visit 08.23.2022) He continue to admit control of symptoms with the use of  Omeprazole 40 mg, 1 QD and Famotidine 20 MG, 1 tablet PM ( before dinner).

## 2023-08-15 NOTE — HPI-ABDOMINAL PAIN
Patient denies any recent episodes of abdominal pain at this time.   (Last Visit 02.21.2023) Denies continues episodes of abdominal.  (Last Visit 08.23.2022) Admits episodes of RLQ abdominal pain.  He took Tramadol 50 MG, 1 QID prn with relief. He states that he no longer takes Iron supplements at this time due to increased abdominal pain.

## 2023-08-15 NOTE — HPI-RECTAL BLEEDING
Patient states he has not had any recent episodes of rectal bleeding.  (Last Visit 02.21.2023) Patient denies any recent episodes of rectal bleeding. Currently, having 1 bowel movement a day, without strain. Stools are semi formed without the presence of blood, mucus, and melena. He denies any pruritus ani or rectal pain.    (Last Visit 08.23.2022) He continue to deny episodes of rectal bleeding. Currently reports 2 bowel movements a day without strain. His stools are formed without blood or mucus present. He denies any rectal pain or pruritus ani.

## 2023-09-06 ENCOUNTER — WEB ENCOUNTER (OUTPATIENT)
Dept: URBAN - METROPOLITAN AREA CLINIC 31 | Facility: CLINIC | Age: 58
End: 2023-09-06

## 2023-09-06 LAB
ABSOLUTE BASOPHILS: 52
ABSOLUTE EOSINOPHILS: 203
ABSOLUTE LYMPHOCYTES: 2703
ABSOLUTE MONOCYTES: 464
ABSOLUTE NEUTROPHILS: 2378
BASOPHILS: 0.9
EOSINOPHILS: 3.5
FERRITIN, SERUM: 29
HEMATOCRIT: 42.3
HEMOGLOBIN: 13.4
IRON BIND.CAP.(TIBC): 420
IRON SATURATION: 25
IRON: 105
LYMPHOCYTES: 46.6
MCH: 28.9
MCHC: 31.7
MCV: 91.2
MONOCYTES: 8
MPV: 10.3
NEUTROPHILS: 41
PLATELET COUNT: 238
RDW: 12.1
RED BLOOD CELL COUNT: 4.64
WHITE BLOOD CELL COUNT: 5.8

## 2023-12-04 ENCOUNTER — LAB OUTSIDE AN ENCOUNTER (OUTPATIENT)
Dept: URBAN - METROPOLITAN AREA CLINIC 35 | Facility: CLINIC | Age: 58
End: 2023-12-04

## 2023-12-05 LAB
ABSOLUTE BASOPHILS: 39
ABSOLUTE EOSINOPHILS: 241
ABSOLUTE LYMPHOCYTES: 3055
ABSOLUTE MONOCYTES: 605
ABSOLUTE NEUTROPHILS: 2561
BASOPHILS: 0.6
EOSINOPHILS: 3.7
FERRITIN, SERUM: 50
HEMATOCRIT: 45.7
HEMOGLOBIN: 14.8
IRON BIND.CAP.(TIBC): 391
IRON SATURATION: 28
IRON: 110
LYMPHOCYTES: 47
MCH: 29.6
MCHC: 32.4
MCV: 91.4
MONOCYTES: 9.3
MPV: 10.4
NEUTROPHILS: 39.4
PLATELET COUNT: 241
RDW: 12.4
RED BLOOD CELL COUNT: 5
WHITE BLOOD CELL COUNT: 6.5

## 2024-01-31 ENCOUNTER — WEB ENCOUNTER (OUTPATIENT)
Dept: URBAN - METROPOLITAN AREA CLINIC 35 | Facility: CLINIC | Age: 59
End: 2024-01-31

## 2024-01-31 RX ORDER — FAMOTIDINE 20 MG/1
1 TABLET AT BEDTIME AS NEEDED TABLET, FILM COATED ORAL ONCE A DAY
Qty: 90 TABLET | Refills: 3

## 2024-06-04 ENCOUNTER — OFFICE VISIT (OUTPATIENT)
Dept: URBAN - METROPOLITAN AREA CLINIC 35 | Facility: CLINIC | Age: 59
End: 2024-06-04
Payer: COMMERCIAL

## 2024-06-04 VITALS
HEART RATE: 71 BPM | SYSTOLIC BLOOD PRESSURE: 124 MMHG | HEIGHT: 69 IN | DIASTOLIC BLOOD PRESSURE: 72 MMHG | OXYGEN SATURATION: 98 % | WEIGHT: 197 LBS | BODY MASS INDEX: 29.18 KG/M2

## 2024-06-04 DIAGNOSIS — D50.9 IRON DEFICIENCY ANEMIA, UNSPECIFIED: ICD-10-CM

## 2024-06-04 DIAGNOSIS — K21.9 GASTRO-ESOPHAGEAL REFLUX DISEASE WITHOUT ESOPHAGITIS: ICD-10-CM

## 2024-06-04 PROCEDURE — 99213 OFFICE O/P EST LOW 20 MIN: CPT | Performed by: INTERNAL MEDICINE

## 2024-06-04 RX ORDER — FAMOTIDINE 20 MG/1
1 TABLET AT BEDTIME AS NEEDED TABLET, FILM COATED ORAL ONCE A DAY
Qty: 90 TABLET | Refills: 3

## 2024-06-04 RX ORDER — PRENATAL 168/IRON/FOLIC/OMEGA3 27-800-235
CAPSULE ORAL
Status: ACTIVE | COMMUNITY

## 2024-06-04 RX ORDER — OMEPRAZOLE 40 MG/1
1 CAPSULE 30 MINUTES BEFORE MORNING MEAL CAPSULE, DELAYED RELEASE ORAL ONCE A DAY
Qty: 90 | Refills: 3 | Status: ON HOLD | COMMUNITY

## 2024-06-04 RX ORDER — OMEPRAZOLE 20 MG/1
1 CAPSULE 30 MINUTES BEFORE MORNING MEAL CAPSULE, DELAYED RELEASE ORAL ONCE A DAY
Qty: 90 | Refills: 3 | OUTPATIENT

## 2024-06-04 RX ORDER — FERROUS FUMARATE AND POLYSACCHRIDE IRON VITAMIN MINERAL COMPLEX SUPPLEMENT 191.2; 135.9; 1; 210; 20; 5; 5; 7; 25; 3 MG/1; MG/1; MG/1; MG/1; MG/1; MG/1; MG/1; MG/1; MG/1; UG/1
1 CAPSULE CAPSULE ORAL
Qty: 180 | Refills: 1 | Status: ON HOLD | COMMUNITY
Start: 2022-01-13

## 2024-06-04 RX ORDER — LOSARTAN POTASSIUM 100 MG/1
1 TABLET TABLET ORAL ONCE A DAY
Status: ACTIVE | COMMUNITY

## 2024-06-04 RX ORDER — RANITIDINE HYDROCHLORIDE 150 MG/1
1 CAPSULE CAPSULE ORAL TWICE A DAY
Status: ON HOLD | COMMUNITY

## 2024-06-04 RX ORDER — OMEPRAZOLE 20 MG/1
1 CAPSULE 30 MINUTES BEFORE MORNING MEAL CAPSULE, DELAYED RELEASE ORAL ONCE A DAY
Qty: 90 | Refills: 3 | Status: ACTIVE | COMMUNITY

## 2024-06-04 RX ORDER — ATORVASTATIN CALCIUM 40 MG/1
1 TABLET TABLET, FILM COATED ORAL ONCE A DAY
Status: ACTIVE | COMMUNITY

## 2024-06-04 RX ORDER — FAMOTIDINE 20 MG/1
1 TABLET AT BEDTIME AS NEEDED TABLET, FILM COATED ORAL ONCE A DAY
Qty: 90 TABLET | Refills: 3 | Status: ACTIVE | COMMUNITY

## 2024-06-04 RX ORDER — FLUOXETINE HYDROCHLORIDE 20 MG/1
1 CAPSULE IN THE MORNING CAPSULE ORAL ONCE A DAY
Status: ON HOLD | COMMUNITY

## 2024-06-04 RX ORDER — CYCLOBENZAPRINE HYDROCHLORIDE 5 MG/1
1 TABLET AT BEDTIME AS NEEDED TABLET, FILM COATED ORAL ONCE A DAY
Status: ACTIVE | COMMUNITY

## 2024-06-04 RX ORDER — LEVOTHYROXINE SODIUM 150 UG/1
1 TABLET ON AN EMPTY STOMACH IN THE MORNING TABLET ORAL ONCE A DAY
Status: ACTIVE | COMMUNITY

## 2024-06-04 RX ORDER — FERROUS FUMARATE AND POLYSACCHRIDE IRON VITAMIN MINERAL COMPLEX SUPPLEMENT 191.2; 135.9; 1; 210; 20; 5; 5; 7; 25; 3 MG/1; MG/1; MG/1; MG/1; MG/1; MG/1; MG/1; MG/1; MG/1; UG/1
1 CAPSULE CAPSULE ORAL ONCE A DAY
Qty: 90 CAPSULE | Refills: 3 | OUTPATIENT
Start: 2022-01-13

## 2024-06-04 RX ORDER — VITAMIN K2 90 MCG
1 CAPSULE CAPSULE ORAL ONCE A DAY
Status: ON HOLD | COMMUNITY

## 2024-06-04 RX ORDER — OCTISALATE, AVOBENZONE, HOMOSALATE, AND OCTOCRYLENE 29.4; 29.4; 49; 25.48 MG/ML; MG/ML; MG/ML; MG/ML
AS DIRECTED LOTION TOPICAL
Status: ON HOLD | COMMUNITY
Start: 2017-01-25

## 2024-06-04 RX ORDER — SEMAGLUTIDE 1.34 MG/ML
AS DIRECTED INJECTION, SOLUTION SUBCUTANEOUS
Status: ON HOLD | COMMUNITY

## 2024-06-04 NOTE — HPI-ABDOMINAL PAIN
He continues to deny any recent episodes of abdominal pain.  (Last Visit 08.15.2023) Patient denies any recent episodes of abdominal pain at this time.   (Last Visit 02.21.2023) Denies continues episodes of abdominal.  (Last Visit 08.23.2022) Admits episodes of RLQ abdominal pain.  He took Tramadol 50 MG, 1 QID prn with relief. He states that he no longer takes Iron supplements at this time due to increased abdominal pain.

## 2024-06-04 NOTE — HPI-RECTAL BLEEDING
He continues to deny any episodes of rectal bleeding.   (Last Visit 08.15.2023) Patient states he has not had any recent episodes of rectal bleeding.  (Last Visit 02.21.2023) Patient denies any recent episodes of rectal bleeding. Currently, having 1 bowel movement a day, without strain. Stools are semi formed without the presence of blood, mucus, and melena. He denies any pruritus ani or rectal pain.    (Last Visit 08.23.2022) He continue to deny episodes of rectal bleeding. Currently reports 2 bowel movements a day without strain. His stools are formed without blood or mucus present. He denies any rectal pain or pruritus ani.

## 2024-06-04 NOTE — HPI-IRON DEFICIENCY
Patient present today for a yearly follow-up of iron deficiency. He denies any iron infusions or supplements. Patient denies any recent episodes of blood or melena in stools, cold extremities, lightheadedness, dizziness, shortness of breath or headaches. He admits fatigue. Patient states that he discontinued the iron in March of 2024, per his hematologist. Patient is now under the care of Dr. Blanton. Patient states that he consulted with Dr. Blanton on 03/01/2024.   Most recent labs were completed 05.09.2024 revealed %Saturation: 19L, all other labs were normal.   Labs 03.01.2024 were all normal.  Labs 02.20.2024 Triglycerides: 181H, all other labs were normal.    (Last Visit 08.15.2023) Patient presents today for a 6 month follow up of iron deficiency. He denies any iron infusions or supplements. Patient denies any associated symptoms of blood or melena in stools, epigastric/abdominal pains, fatigue, cold extremities, lightheadedness, dizziness, shortness of breath or headaches.   Most recent labs were completed 02.24.2023 which were all normal.  (Last Visit 02.21.2023) Patient presents today for a follow up of anemia. He currently denies any iron supplements. Patient denies any blood or melena in stools, epigastric/abdominal pains, fatigue, cold extremities, lightheadedness, dizziness, shortness of breath or headaches.   He admits that since his last visit he seen his PCP and his Iron levels were normal, he no longer takes any Iron Supplements at this time. Patient reports that he has any appt with his PCP on 2/23/2023 and more labs will be drawn at that time.      (Last Visit 08.23.2022) Patient presents today for a follow up of JAZMIN.  He discontinued taking Iron last week. He reports that it was causing abdominal discomfort and loose stools.      He completed labs with PCP (7/25/2022) CBC (WNL).  He admit/denies any blood or melena in stools, epigastric/abdominal pains, fatigue, cold extremities, light headedness, dizziness.

## 2024-06-04 NOTE — HPI-GERD
He denies any recent flares with his gerd symptoms. Patient admits continued the use of Famotidine 20mg and Omeprazole 20mg with relief of symptoms. He states he has not had any associated symptoms of heartburn, vomiting, nausea, dysphagia, excessive belching, globus, bloating/gas, early satiety, or changes in appetite.  (Last Visit 08.15.2023) Patient states he has not had any recent flares or issues with his GERD. Patient admits continued use of Omeprazole 20mg and Famotidine 20mg with control symptoms. He denies any associated symptoms of dysphagia, globus, sour eructation, bloating/gas, indigestion, early satiety, changes in appetite, coughing, abdominal/epigastric pain.  (Last Visit 02.21.2023) He continues the use of Omeprazole 40mg and Famotidine 20mg with control symptoms. Patient reports that it is very rare that he may have to take 1-2 Tums. Denies dysphagia, globus, sour eructations, bloating/gas, indigestion, early satiety, changes in appetite, coughing, abdominal/epigastric pain, or changes in bowel habits.   (Last Visit 08.23.2022) He continue to admit control of symptoms with the use of  Omeprazole 40 mg, 1 QD and Famotidine 20 MG, 1 tablet PM ( before dinner).

## 2024-06-08 ENCOUNTER — WEB ENCOUNTER (OUTPATIENT)
Dept: URBAN - METROPOLITAN AREA CLINIC 35 | Facility: CLINIC | Age: 59
End: 2024-06-08

## 2024-06-08 RX ORDER — FERROUS FUMARATE AND POLYSACCHRIDE IRON VITAMIN MINERAL COMPLEX SUPPLEMENT 191.2; 135.9; 1; 210; 20; 5; 5; 7; 25; 3 MG/1; MG/1; MG/1; MG/1; MG/1; MG/1; MG/1; MG/1; MG/1; UG/1
1 CAPSULE CAPSULE ORAL ONCE A DAY
Qty: 90 CAPSULE | Refills: 3
Start: 2022-01-13

## 2024-06-10 ENCOUNTER — WEB ENCOUNTER (OUTPATIENT)
Dept: URBAN - METROPOLITAN AREA CLINIC 35 | Facility: CLINIC | Age: 59
End: 2024-06-10

## 2024-07-08 ENCOUNTER — LAB OUTSIDE AN ENCOUNTER (OUTPATIENT)
Dept: URBAN - METROPOLITAN AREA CLINIC 35 | Facility: CLINIC | Age: 59
End: 2024-07-08

## 2024-08-15 ENCOUNTER — OFFICE VISIT (OUTPATIENT)
Dept: URBAN - METROPOLITAN AREA CLINIC 33 | Facility: CLINIC | Age: 59
End: 2024-08-15

## 2024-11-25 ENCOUNTER — LAB OUTSIDE AN ENCOUNTER (OUTPATIENT)
Dept: URBAN - METROPOLITAN AREA CLINIC 35 | Facility: CLINIC | Age: 59
End: 2024-11-25

## 2024-11-27 ENCOUNTER — WEB ENCOUNTER (OUTPATIENT)
Dept: URBAN - METROPOLITAN AREA CLINIC 35 | Facility: CLINIC | Age: 59
End: 2024-11-27

## 2024-11-27 LAB
ABSOLUTE BASOPHILS: 39
ABSOLUTE EOSINOPHILS: 149
ABSOLUTE LYMPHOCYTES: 2508
ABSOLUTE MONOCYTES: 446
ABSOLUTE NEUTROPHILS: 2360
BASOPHILS: 0.7
EOSINOPHILS: 2.7
FERRITIN, SERUM: 46
HEMATOCRIT: 41.9
HEMOGLOBIN: 13.4
IRON BIND.CAP.(TIBC): 375
IRON SATURATION: 23
IRON: 85
LYMPHOCYTES: 45.6
MCH: 28.9
MCHC: 32
MCV: 90.3
MONOCYTES: 8.1
MPV: 12.4
NEUTROPHILS: 42.9
PLATELET COUNT: 230
RDW: 12.4
RED BLOOD CELL COUNT: 4.64
WHITE BLOOD CELL COUNT: 5.5

## 2024-12-03 ENCOUNTER — OFFICE VISIT (OUTPATIENT)
Dept: URBAN - METROPOLITAN AREA CLINIC 35 | Facility: CLINIC | Age: 59
End: 2024-12-03

## 2025-01-21 ENCOUNTER — OFFICE VISIT (OUTPATIENT)
Dept: URBAN - METROPOLITAN AREA CLINIC 35 | Facility: CLINIC | Age: 60
End: 2025-01-21

## 2025-03-28 ENCOUNTER — ERX REFILL RESPONSE (OUTPATIENT)
Dept: URBAN - METROPOLITAN AREA CLINIC 35 | Facility: CLINIC | Age: 60
End: 2025-03-28

## 2025-03-28 RX ORDER — OMEPRAZOLE 20 MG/1
1 CAPSULE 30 MINUTES BEFORE MORNING MEAL CAPSULE, DELAYED RELEASE ORAL ONCE A DAY
Qty: 90 | Refills: 3